# Patient Record
Sex: FEMALE | Race: OTHER | Employment: OTHER | URBAN - METROPOLITAN AREA
[De-identification: names, ages, dates, MRNs, and addresses within clinical notes are randomized per-mention and may not be internally consistent; named-entity substitution may affect disease eponyms.]

---

## 2022-04-21 ENCOUNTER — NEW PATIENT (OUTPATIENT)
Dept: URBAN - METROPOLITAN AREA CLINIC 35 | Facility: CLINIC | Age: 62
End: 2022-04-21

## 2022-04-21 DIAGNOSIS — H52.7: ICD-10-CM

## 2022-04-21 DIAGNOSIS — H40.1130: ICD-10-CM

## 2022-04-21 PROCEDURE — 92015 DETERMINE REFRACTIVE STATE: CPT

## 2022-04-21 PROCEDURE — 92004 COMPRE OPH EXAM NEW PT 1/>: CPT

## 2022-04-21 PROCEDURE — 92133 CPTRZD OPH DX IMG PST SGM ON: CPT

## 2022-04-21 ASSESSMENT — VISUAL ACUITY
OS_CC: J1-
OD_SC: 20/40+2
OD_CC: J1
OS_SC: 20/30-1

## 2022-04-21 ASSESSMENT — TONOMETRY
OD_IOP_MMHG: 20
OS_IOP_MMHG: 20

## 2022-05-16 ENCOUNTER — TECH ONLY (OUTPATIENT)
Dept: URBAN - METROPOLITAN AREA CLINIC 39 | Facility: CLINIC | Age: 62
End: 2022-05-16

## 2022-05-16 DIAGNOSIS — H25.813: ICD-10-CM

## 2022-05-16 DIAGNOSIS — H40.1130: ICD-10-CM

## 2022-05-16 DIAGNOSIS — H52.7: ICD-10-CM

## 2022-05-16 PROCEDURE — 99211T TECH SERVICE

## 2022-05-16 PROCEDURE — 92083 EXTENDED VISUAL FIELD XM: CPT

## 2022-05-31 ENCOUNTER — FOLLOW UP (OUTPATIENT)
Dept: URBAN - METROPOLITAN AREA CLINIC 35 | Facility: CLINIC | Age: 62
End: 2022-05-31

## 2022-05-31 DIAGNOSIS — H40.1130: ICD-10-CM

## 2022-05-31 PROCEDURE — 92012 INTRM OPH EXAM EST PATIENT: CPT

## 2022-05-31 ASSESSMENT — TONOMETRY
OS_IOP_MMHG: 18
OD_IOP_MMHG: 18

## 2022-05-31 ASSESSMENT — VISUAL ACUITY
OD_SC: 20/30-2
OS_SC: 20/40+2

## 2023-04-21 ENCOUNTER — TECH ONLY (OUTPATIENT)
Dept: URBAN - METROPOLITAN AREA CLINIC 35 | Facility: CLINIC | Age: 63
End: 2023-04-21

## 2023-04-21 DIAGNOSIS — H40.1130: ICD-10-CM

## 2023-04-21 PROCEDURE — 92083 EXTENDED VISUAL FIELD XM: CPT

## 2023-04-21 PROCEDURE — 99211T TECH SERVICE

## 2023-04-26 ENCOUNTER — COMPREHENSIVE EXAM (OUTPATIENT)
Dept: URBAN - METROPOLITAN AREA CLINIC 35 | Facility: CLINIC | Age: 63
End: 2023-04-26

## 2023-04-26 DIAGNOSIS — H40.1130: ICD-10-CM

## 2023-04-26 DIAGNOSIS — H52.7: ICD-10-CM

## 2023-04-26 DIAGNOSIS — H25.813: ICD-10-CM

## 2023-04-26 PROCEDURE — 92015 DETERMINE REFRACTIVE STATE: CPT

## 2023-04-26 PROCEDURE — 92014 COMPRE OPH EXAM EST PT 1/>: CPT

## 2023-04-26 PROCEDURE — 92133 CPTRZD OPH DX IMG PST SGM ON: CPT

## 2023-04-26 ASSESSMENT — VISUAL ACUITY
OU_CC: J4
OD_SC: 20/30-2
OS_SC: 20/30-2
OS_CC: J6
OD_CC: J4

## 2023-04-26 ASSESSMENT — TONOMETRY
OD_IOP_MMHG: 15
OS_IOP_MMHG: 15

## 2023-06-05 NOTE — PATIENT DISCUSSION
The IOP is above the target range. Recheck 4 weeks. Birth Control Pills Pregnancy And Lactation Text: This medication should be avoided if pregnant and for the first 30 days post-partum.

## 2024-04-30 ENCOUNTER — COMPREHENSIVE EXAM (OUTPATIENT)
Dept: URBAN - METROPOLITAN AREA CLINIC 35 | Facility: CLINIC | Age: 64
End: 2024-04-30

## 2024-04-30 ENCOUNTER — TECH ONLY (OUTPATIENT)
Dept: URBAN - METROPOLITAN AREA CLINIC 35 | Facility: CLINIC | Age: 64
End: 2024-04-30

## 2024-04-30 DIAGNOSIS — H40.1130: ICD-10-CM

## 2024-04-30 DIAGNOSIS — H25.813: ICD-10-CM

## 2024-04-30 DIAGNOSIS — H52.7: ICD-10-CM

## 2024-04-30 PROCEDURE — 92015 DETERMINE REFRACTIVE STATE: CPT

## 2024-04-30 PROCEDURE — 92083 EXTENDED VISUAL FIELD XM: CPT

## 2024-04-30 PROCEDURE — 99211T TECH SERVICE

## 2024-04-30 PROCEDURE — 92014 COMPRE OPH EXAM EST PT 1/>: CPT

## 2024-04-30 ASSESSMENT — VISUAL ACUITY
OS_CC: J3 OTC
OU_SC: 20/30-1
OU_CC: J2 OTC
OS_SC: 20/40-1
OD_SC: 20/40-1

## 2024-04-30 ASSESSMENT — TONOMETRY
OD_IOP_MMHG: 24
OD_IOP_MMHG: 22
OS_IOP_MMHG: 26
OS_IOP_MMHG: 23

## 2024-06-12 ENCOUNTER — FOLLOW UP (OUTPATIENT)
Dept: URBAN - METROPOLITAN AREA CLINIC 35 | Facility: CLINIC | Age: 64
End: 2024-06-12

## 2024-06-12 DIAGNOSIS — H25.813: ICD-10-CM

## 2024-06-12 DIAGNOSIS — H40.1130: ICD-10-CM

## 2024-06-12 PROCEDURE — 92012 INTRM OPH EXAM EST PATIENT: CPT

## 2024-06-12 ASSESSMENT — TONOMETRY
OD_IOP_MMHG: 24
OS_IOP_MMHG: 25

## 2024-06-12 ASSESSMENT — VISUAL ACUITY
OU_SC: 20/25
OS_SC: 20/30-2
OD_SC: 20/30-2

## 2024-07-24 ENCOUNTER — CONSULTATION/EVALUATION (OUTPATIENT)
Dept: URBAN - METROPOLITAN AREA CLINIC 39 | Facility: CLINIC | Age: 64
End: 2024-07-24

## 2024-07-24 DIAGNOSIS — H40.1131: ICD-10-CM

## 2024-07-24 PROCEDURE — 92020 GONIOSCOPY: CPT

## 2024-07-24 PROCEDURE — 92250 FUNDUS PHOTOGRAPHY W/I&R: CPT

## 2024-07-24 PROCEDURE — 76514 ECHO EXAM OF EYE THICKNESS: CPT

## 2024-07-24 PROCEDURE — 92004 COMPRE OPH EXAM NEW PT 1/>: CPT

## 2024-07-24 RX ORDER — PREDNISOLONE ACETATE 10 MG/ML: 1 SUSPENSION/ DROPS OPHTHALMIC

## 2024-07-24 ASSESSMENT — VISUAL ACUITY
OD_SC: 20/30-2
OS_SC: 20/30-1

## 2024-07-24 ASSESSMENT — TONOMETRY
OD_IOP_MMHG: 25
OS_IOP_MMHG: 27

## 2024-07-24 ASSESSMENT — PACHYMETRY
OD_CT_UM: 528
OS_CT_UM: 527

## 2024-08-21 ENCOUNTER — FOLLOW UP (OUTPATIENT)
Dept: URBAN - METROPOLITAN AREA CLINIC 35 | Facility: CLINIC | Age: 64
End: 2024-08-21

## 2024-08-21 DIAGNOSIS — H40.1131: ICD-10-CM

## 2024-08-21 DIAGNOSIS — H25.813: ICD-10-CM

## 2024-08-21 PROCEDURE — 92012 INTRM OPH EXAM EST PATIENT: CPT

## 2024-08-21 ASSESSMENT — VISUAL ACUITY
OD_SC: 20/40-1
OU_SC: 20/25-2
OS_SC: 20/40-1

## 2024-08-21 ASSESSMENT — TONOMETRY
OS_IOP_MMHG: 22
OD_IOP_MMHG: 20

## 2024-10-30 ENCOUNTER — FOLLOW UP (OUTPATIENT)
Dept: URBAN - METROPOLITAN AREA CLINIC 35 | Facility: CLINIC | Age: 64
End: 2024-10-30

## 2024-10-30 DIAGNOSIS — H25.813: ICD-10-CM

## 2024-10-30 DIAGNOSIS — H40.1131: ICD-10-CM

## 2024-10-30 DIAGNOSIS — H04.123: ICD-10-CM

## 2024-10-30 PROCEDURE — 92012 INTRM OPH EXAM EST PATIENT: CPT

## 2024-10-30 RX ORDER — LATANOPROSTENE BUNOD 0.24 MG/ML: 1 SOLUTION/ DROPS OPHTHALMIC EVERY EVENING

## 2024-11-20 ENCOUNTER — FOLLOW UP (OUTPATIENT)
Dept: URBAN - METROPOLITAN AREA CLINIC 35 | Facility: CLINIC | Age: 64
End: 2024-11-20

## 2024-11-20 DIAGNOSIS — H40.1131: ICD-10-CM

## 2024-11-20 DIAGNOSIS — H25.813: ICD-10-CM

## 2024-11-20 DIAGNOSIS — H04.123: ICD-10-CM

## 2024-11-20 PROCEDURE — 92012 INTRM OPH EXAM EST PATIENT: CPT

## 2024-12-18 ENCOUNTER — FOLLOW UP (OUTPATIENT)
Age: 64
End: 2024-12-18

## 2024-12-18 DIAGNOSIS — H25.813: ICD-10-CM

## 2024-12-18 DIAGNOSIS — H04.123: ICD-10-CM

## 2024-12-18 DIAGNOSIS — H40.1131: ICD-10-CM

## 2024-12-18 PROCEDURE — 92012 INTRM OPH EXAM EST PATIENT: CPT

## 2025-01-06 ENCOUNTER — CONSULTATION/EVALUATION (OUTPATIENT)
Age: 65
End: 2025-01-06

## 2025-01-06 DIAGNOSIS — H25.813: ICD-10-CM

## 2025-01-06 DIAGNOSIS — H40.1131: ICD-10-CM

## 2025-01-06 PROCEDURE — 92136 OPHTHALMIC BIOMETRY: CPT

## 2025-01-06 PROCEDURE — 92499PMN IMPRIMIS PRED-MOXI-NEPAF 5ML

## 2025-01-06 PROCEDURE — 99214 OFFICE O/P EST MOD 30 MIN: CPT

## 2025-01-06 PROCEDURE — 92025 CPTRIZED CORNEAL TOPOGRAPHY: CPT

## 2025-02-19 ENCOUNTER — PRE-OP/H&P (OUTPATIENT)
Age: 65
End: 2025-02-19

## 2025-02-19 ENCOUNTER — SURGERY/PROCEDURE (OUTPATIENT)
Age: 65
End: 2025-02-19

## 2025-02-19 DIAGNOSIS — H25.811: ICD-10-CM

## 2025-02-19 DIAGNOSIS — H25.813: ICD-10-CM

## 2025-02-19 DIAGNOSIS — H40.1111: ICD-10-CM

## 2025-02-19 DIAGNOSIS — H40.1131: ICD-10-CM

## 2025-02-19 PROCEDURE — 99199PCV PROF CUSTOM VISION PACKAGE

## 2025-02-19 PROCEDURE — 66999LNSR LENSAR LASER FOR CAT SX

## 2025-02-19 PROCEDURE — 65772LRI LRI DURING CAT SX

## 2025-02-19 PROCEDURE — 65820 GONIOTOMY: CPT

## 2025-02-19 PROCEDURE — 99211T TECH SERVICE

## 2025-02-19 PROCEDURE — 66984CV REMOVE CATARACT, INSERT LENS, CUSTOM VISION

## 2025-02-20 ENCOUNTER — POST-OP (OUTPATIENT)
Age: 65
End: 2025-02-20

## 2025-02-20 DIAGNOSIS — Z96.1: ICD-10-CM

## 2025-02-20 DIAGNOSIS — H40.1131: ICD-10-CM

## 2025-02-20 PROCEDURE — 99024 POSTOP FOLLOW-UP VISIT: CPT

## 2025-02-24 ENCOUNTER — POST-OP (OUTPATIENT)
Age: 65
End: 2025-02-24

## 2025-02-24 DIAGNOSIS — H25.812: ICD-10-CM

## 2025-02-24 DIAGNOSIS — Z96.1: ICD-10-CM

## 2025-02-24 DIAGNOSIS — H40.1131: ICD-10-CM

## 2025-02-24 PROCEDURE — 99211T TECH SERVICE

## 2025-02-24 RX ORDER — BRIMONIDINE TARTRATE, TIMOLOL MALEATE 2; 5 MG/ML; MG/ML: 1 SOLUTION/ DROPS OPHTHALMIC TWICE A DAY

## 2025-02-26 ENCOUNTER — POST-OP (OUTPATIENT)
Age: 65
End: 2025-02-26

## 2025-02-26 DIAGNOSIS — H25.812: ICD-10-CM

## 2025-02-26 DIAGNOSIS — H40.1131: ICD-10-CM

## 2025-02-26 DIAGNOSIS — Z96.1: ICD-10-CM

## 2025-02-26 DIAGNOSIS — H40.053: ICD-10-CM

## 2025-02-26 PROCEDURE — 99024 POSTOP FOLLOW-UP VISIT: CPT

## 2025-03-03 ENCOUNTER — POST-OP (OUTPATIENT)
Age: 65
End: 2025-03-03

## 2025-03-03 DIAGNOSIS — Z96.1: ICD-10-CM

## 2025-03-03 DIAGNOSIS — H25.812: ICD-10-CM

## 2025-03-03 DIAGNOSIS — H40.053: ICD-10-CM

## 2025-03-03 DIAGNOSIS — H40.1131: ICD-10-CM

## 2025-03-03 PROCEDURE — 99024 POSTOP FOLLOW-UP VISIT: CPT

## 2025-03-05 ENCOUNTER — SURGERY/PROCEDURE (OUTPATIENT)
Age: 65
End: 2025-03-05

## 2025-03-05 ENCOUNTER — PRE-OP/H&P (OUTPATIENT)
Age: 65
End: 2025-03-05

## 2025-03-05 DIAGNOSIS — H25.812: ICD-10-CM

## 2025-03-05 DIAGNOSIS — H40.1121: ICD-10-CM

## 2025-03-05 DIAGNOSIS — H40.053: ICD-10-CM

## 2025-03-05 DIAGNOSIS — Z96.1: ICD-10-CM

## 2025-03-05 DIAGNOSIS — H40.1131: ICD-10-CM

## 2025-03-05 PROCEDURE — 99199PCV PROF CUSTOM VISION PACKAGE

## 2025-03-05 PROCEDURE — 99211T TECH SERVICE

## 2025-03-05 PROCEDURE — 66984CV REMOVE CATARACT, INSERT LENS, CUSTOM VISION: Mod: 79,LT

## 2025-03-05 PROCEDURE — 65772LRI LRI DURING CAT SX

## 2025-03-05 PROCEDURE — 66999LNSR LENSAR LASER FOR CAT SX

## 2025-03-05 PROCEDURE — 65820 GONIOTOMY: CPT | Mod: 79,LT

## 2025-03-06 ENCOUNTER — POST-OP (OUTPATIENT)
Age: 65
End: 2025-03-06

## 2025-03-06 DIAGNOSIS — H40.053: ICD-10-CM

## 2025-03-06 DIAGNOSIS — H40.1131: ICD-10-CM

## 2025-03-06 DIAGNOSIS — Z96.1: ICD-10-CM

## 2025-03-06 PROCEDURE — 99024 POSTOP FOLLOW-UP VISIT: CPT

## 2025-03-20 ENCOUNTER — POST-OP (OUTPATIENT)
Age: 65
End: 2025-03-20

## 2025-03-20 DIAGNOSIS — Z96.1: ICD-10-CM

## 2025-03-20 DIAGNOSIS — H40.053: ICD-10-CM

## 2025-03-20 DIAGNOSIS — H40.1131: ICD-10-CM

## 2025-03-20 PROCEDURE — 99024 POSTOP FOLLOW-UP VISIT: CPT

## 2025-04-25 ENCOUNTER — POST-OP (OUTPATIENT)
Age: 65
End: 2025-04-25

## 2025-04-25 DIAGNOSIS — Z96.1: ICD-10-CM

## 2025-04-25 DIAGNOSIS — H26.493: ICD-10-CM

## 2025-04-25 PROCEDURE — 99024 POSTOP FOLLOW-UP VISIT: CPT

## 2025-06-18 ENCOUNTER — FOLLOW UP (OUTPATIENT)
Age: 65
End: 2025-06-18

## 2025-06-18 DIAGNOSIS — H26.493: ICD-10-CM

## 2025-06-18 DIAGNOSIS — H40.1131: ICD-10-CM

## 2025-06-18 DIAGNOSIS — H40.053: ICD-10-CM

## 2025-06-18 DIAGNOSIS — Z96.1: ICD-10-CM

## 2025-06-18 PROCEDURE — 92012 INTRM OPH EXAM EST PATIENT: CPT

## 2025-07-07 ENCOUNTER — CONSULTATION/EVALUATION (OUTPATIENT)
Age: 65
End: 2025-07-07

## 2025-07-07 DIAGNOSIS — H26.493: ICD-10-CM

## 2025-07-07 DIAGNOSIS — H40.053: ICD-10-CM

## 2025-07-07 DIAGNOSIS — H40.1131: ICD-10-CM

## 2025-07-07 DIAGNOSIS — Z96.1: ICD-10-CM

## 2025-07-07 PROCEDURE — 99212 OFFICE O/P EST SF 10 MIN: CPT

## 2025-07-07 RX ORDER — BRIMONIDINE TARTRATE, TIMOLOL MALEATE 2; 5 MG/ML; MG/ML: 1 SOLUTION/ DROPS OPHTHALMIC TWICE A DAY

## 2025-07-09 ENCOUNTER — FOLLOW UP (OUTPATIENT)
Age: 65
End: 2025-07-09

## 2025-07-09 DIAGNOSIS — H40.1131: ICD-10-CM

## 2025-07-09 DIAGNOSIS — Z96.1: ICD-10-CM

## 2025-07-09 DIAGNOSIS — H40.053: ICD-10-CM

## 2025-07-09 DIAGNOSIS — H26.493: ICD-10-CM

## 2025-07-09 PROCEDURE — 92012 INTRM OPH EXAM EST PATIENT: CPT

## 2025-07-14 ENCOUNTER — CONSULTATION/EVALUATION (OUTPATIENT)
Age: 65
End: 2025-07-14

## 2025-07-14 ENCOUNTER — SURGERY/PROCEDURE (OUTPATIENT)
Age: 65
End: 2025-07-14

## 2025-07-14 DIAGNOSIS — H40.053: ICD-10-CM

## 2025-07-14 DIAGNOSIS — H26.493: ICD-10-CM

## 2025-07-14 DIAGNOSIS — Z96.1: ICD-10-CM

## 2025-07-14 PROCEDURE — 99213 OFFICE O/P EST LOW 20 MIN: CPT | Mod: 57

## 2025-07-14 PROCEDURE — 6682150 YAG CAPSULOTOMY: Mod: 50

## 2025-07-17 ENCOUNTER — POST-OP (OUTPATIENT)
Age: 65
End: 2025-07-17

## 2025-07-17 DIAGNOSIS — H40.053: ICD-10-CM

## 2025-07-17 DIAGNOSIS — H40.1131: ICD-10-CM

## 2025-07-17 DIAGNOSIS — Z98.890: ICD-10-CM

## 2025-07-17 DIAGNOSIS — H26.493: ICD-10-CM

## 2025-07-17 DIAGNOSIS — Z96.1: ICD-10-CM

## 2025-07-17 PROCEDURE — 99024 POSTOP FOLLOW-UP VISIT: CPT

## 2025-07-30 ENCOUNTER — POST-OP (OUTPATIENT)
Age: 65
End: 2025-07-30

## 2025-07-30 DIAGNOSIS — H40.1131: ICD-10-CM

## 2025-07-30 DIAGNOSIS — Z96.1: ICD-10-CM

## 2025-07-30 DIAGNOSIS — Z98.890: ICD-10-CM

## 2025-07-30 DIAGNOSIS — H40.053: ICD-10-CM

## 2025-07-30 PROCEDURE — 99024 POSTOP FOLLOW-UP VISIT: CPT

## 2025-08-14 ENCOUNTER — TECH ONLY (OUTPATIENT)
Age: 65
End: 2025-08-14

## 2025-08-14 DIAGNOSIS — H40.1131: ICD-10-CM

## 2025-08-14 DIAGNOSIS — H40.053: ICD-10-CM

## 2025-08-14 PROCEDURE — 99211T TECH SERVICE

## 2025-08-14 PROCEDURE — 92083 EXTENDED VISUAL FIELD XM: CPT
